# Patient Record
Sex: MALE | Race: BLACK OR AFRICAN AMERICAN
[De-identification: names, ages, dates, MRNs, and addresses within clinical notes are randomized per-mention and may not be internally consistent; named-entity substitution may affect disease eponyms.]

---

## 2020-02-12 ENCOUNTER — HOSPITAL ENCOUNTER (EMERGENCY)
Dept: HOSPITAL 11 - JP.ED | Age: 20
Discharge: HOME | End: 2020-02-12
Payer: SELF-PAY

## 2020-02-12 DIAGNOSIS — R45.851: ICD-10-CM

## 2020-02-12 DIAGNOSIS — F41.0: Primary | ICD-10-CM

## 2020-02-12 DIAGNOSIS — F32.9: ICD-10-CM

## 2020-02-12 DIAGNOSIS — Z79.899: ICD-10-CM

## 2020-02-12 PROCEDURE — 99283 EMERGENCY DEPT VISIT LOW MDM: CPT

## 2020-02-12 NOTE — EDM.PDOCBH
ED HPI GENERAL MEDICAL PROBLEM





- General


Chief Complaint: Behavioral/Psych


Stated Complaint: ANXIOUS


Time Seen by Provider: 02/12/20 11:04


Source of Information: Reports: Patient, Family, RN Notes Reviewed


History Limitations: Reports: No Limitations





- History of Present Illness


INITIAL COMMENTS - FREE TEXT/NARRATIVE: 





19-year-old gentleman presents emergency department a complaint of panic attack 

he does have thoughts of suicidal ideation he is currently being evaluated for 

possible bipolar disease as he does have manic swings where he is high for 

several weeks at a time and then crashes hard into depression he recently was 

started on Paxil by his primary care provider unfortunately is only received 1 

dose of that medication he feels the symptoms of the shortness of breath the 

anxiety the tremors the sweatiness did increase with 1 dose medication.  He has 

been on Kenmare's wort in the past trying homeopathic medications he felt this 

did not work for him and stopped taking it.


  ** Left Leg


Pain Score (Numeric/FACES): 4





- Related Data


 Allergies











Allergy/AdvReac Type Severity Reaction Status Date / Time


 


No Known Allergies Allergy   Verified 02/12/20 09:48











Home Meds: 


 Home Meds





PARoxetine [Paxil] 10 mg PO DAILY 02/12/20 [History]











Past Medical History


Gastrointestinal History: Reports: Chronic Constipation


Psychiatric History: Reports: Anxiety, Depression, Suicide Attempt, Other (See 

Below)


Other Psychiatric History: possible Bi polar


Endocrine/Metabolic History: Reports: Other (See Below)


Other Endocrine/Metabolic History: Now having thyroid testing


Dermatologic History: Reports: Eczema





- Infectious Disease History


Infectious Disease History: Reports: Chicken Pox





Social & Family History





- Tobacco Use


Smoking Status *Q: Never Smoker


Second Hand Smoke Exposure: No





- Caffeine Use


Caffeine Use: Reports: Coffee, Tea





- Recreational Drug Use


Recreational Drug Use: Yes


Recreational Drug Type: Reports: Marijuana/Hashish, Other (see below)


Other Recreational Drug Type: marajuana vaping


Recreational Drug Use Frequency: Daily





ED ROS GENERAL





- Review of Systems


Review Of Systems: See Below


Constitutional: Reports: No Symptoms


HEENT: Reports: No Symptoms


Respiratory: Reports: Shortness of Breath


Cardiovascular: Reports: Chest Pain


GI/Abdominal: Reports: Nausea


: Reports: No Symptoms


Musculoskeletal: Reports: No Symptoms


Skin: Reports: No Symptoms


Psychiatric: Reports: Agitation, Anxiety, Depression, Mood Lability, Suicidal 

Ideation.  Denies: Hallucinations, Homicidal Ideation





ED EXAM, BEHAVIORAL HEALTH





- Physical Exam


Exam: See Below


Text/Narrative:: 





Orientated to person place and time, appropriately dressed, well groomed, 

memory to recent and remote events intact, good attention and concentration, 

speech is of adequate rate tone and volume, good fund of knowledge, language is 

appropriate,  Mood and affect are depressed, no pressured thoughts, d positive 

for suicidal ideation, denies homicidal ideation, no hallucinations visual or 

auditory, poor judgment, poor insight, very anxious and tremulous


Exam Limited By: No Limitations


General Appearance: Alert, Moderate Distress





COURSE, BEHAVIORAL HEALTH COMP





- Course


Vital Signs: 


 Last Vital Signs











Temp  96.4 F L  02/12/20 09:43


 


Pulse  65   02/12/20 09:43


 


Resp  16   02/12/20 09:43


 


BP  140/88   02/12/20 09:43


 


Pulse Ox  99   02/12/20 09:43











Orders, Labs, Meds: 


Medications














Discontinued Medications














Generic Name Dose Route Start Last Admin





  Trade Name Freq  PRN Reason Stop Dose Admin


 


Lorazepam  1 mg  02/12/20 11:10  02/12/20 11:30





  Ativan  PO  02/12/20 11:11  1 mg





  ONETIME ONE   Administration





     





     





     





     














Departure





- Departure


Time of Disposition: 11:59


Disposition: Home, Self-Care 01


Condition: Fair


Clinical Impression: 


 Suicidal thoughts, Panic attack








- Discharge Information


Instructions:  Panic Attack, Suicidal Feelings: How to Help Yourself, Helping 

Someone Who is Suicidal


Referrals: 


Barry Carmichael MD [Primary Care Provider] - 


Forms:  ED Department Discharge


Additional Instructions: 


Please establish with your counseling [facility] the [facility] for further 

evaluation and management.  Reduce your Paxil dose to 5 mg once a day, keep 

your follow-up appointment with your primary care provider next Tuesday, 

recommend continuing staying with family members in a safe place.  Please call 

return to the emergency department worsening of symptoms





Sepsis Event Note





- Evaluation


Sepsis Screening Result: No Definite Risk





- Focused Exam


Vital Signs: 


 Vital Signs











  Temp Pulse Resp BP Pulse Ox


 


 02/12/20 09:43  96.4 F L  65  16  140/88  99











Date Exam was Performed: 02/12/20


Time Exam was Performed: 11:57





- Assessment/Plan


Plan: 





Assessment





Acuity = acute





Site and laterality = panic attack with suicidal thoughts





Etiology  = unknown suspicious for underlying mental health possibly bipolar





Manifestations = shortness of breath, chest pain palpitations tremor resolved





Location of injury =  Home





Lab values = none





Plan


He had good improvement with 1 mg Ativan provided in the emergency department, 

prescription written for Ativan 1 mg p.o. twice daily PRN total #10.  He is 

going to reestablish with his counseling at [facility] who is not seen for a 

couple years.  He does have a safe place to go with family members who he will 

be with somebody at all times.  He has a follow-up appointment with his primary 

care provider next Tuesday for medical management.  He is going to reduce his 

Paxil dose to half dose 5 mg/day and try that until he is reevaluated by his 

primary care provider

















 This note was dictated using dragon voice recognition software please call 

with any questions on syntax or grammar.